# Patient Record
Sex: MALE | Race: BLACK OR AFRICAN AMERICAN | Employment: FULL TIME
[De-identification: names, ages, dates, MRNs, and addresses within clinical notes are randomized per-mention and may not be internally consistent; named-entity substitution may affect disease eponyms.]

---

## 2024-02-21 ENCOUNTER — NURSE TRIAGE (OUTPATIENT)
Dept: OTHER | Facility: CLINIC | Age: 44
End: 2024-02-21

## 2024-02-21 NOTE — TELEPHONE ENCOUNTER
[place]    Received call from [place] at [facility] with Red Flag Complaint.    Subjective: Caller states \"I have pain in my lower back and right hip. I put arch support in my shoes but it caused pain  when I took it out every once in a while tingling in my left calf and I have pain in my left foot when I am on my feet \"     Current Symptoms:     Onset: back pain 5 months ago; foot pain 1 week ago    Associated Symptoms:     Pain Severity: 5/10; aching; constant    Temperature:      What has been tried: arch supports    LMP: NA Pregnant: NA    Recommended disposition: Go to [facility]    Care advice provided, patient verbalizes understanding; denies any other questions or concerns; instructed to call back for any new or worsening symptoms.    Patient/Caller agrees with recommended disposition; writer provided warm transfer to [facility] for appointment scheduling    Attention Provider:  Thank you for allowing me to participate in the care of your patient.  The patient was connected to triage in response to information provided to the [facility].  Please do not respond through this encounter as the response is not directed to a shared pool.        Reason for Disposition   MODERATE back pain (e.g., interferes with normal activities) and present > 3 days   Tingling (e.g., pins and needles) of the face, arm or leg on one side of the body, that is present now (Exceptions: Chronic or recurrent symptom lasting > 4 weeks; or tingling from known cause, such as: bumped elbow, carpal tunnel syndrome, pinched nerve, frostbite.)    Protocols used: Back Pain-ADULT-[place], Neurologic Deficit-ADULT-[place]

## 2024-10-16 ENCOUNTER — OFFICE VISIT (OUTPATIENT)
Facility: CLINIC | Age: 44
End: 2024-10-16
Payer: COMMERCIAL

## 2024-10-16 VITALS
HEART RATE: 69 BPM | TEMPERATURE: 97.9 F | SYSTOLIC BLOOD PRESSURE: 132 MMHG | BODY MASS INDEX: 24.94 KG/M2 | HEIGHT: 69 IN | DIASTOLIC BLOOD PRESSURE: 78 MMHG | WEIGHT: 168.4 LBS | RESPIRATION RATE: 98 BRPM

## 2024-10-16 DIAGNOSIS — M54.50 CHRONIC BILATERAL LOW BACK PAIN WITHOUT SCIATICA: ICD-10-CM

## 2024-10-16 DIAGNOSIS — G89.29 CHRONIC BILATERAL LOW BACK PAIN WITHOUT SCIATICA: ICD-10-CM

## 2024-10-16 DIAGNOSIS — F17.290 CIGAR SMOKER: Primary | ICD-10-CM

## 2024-10-16 PROCEDURE — 99407 BEHAV CHNG SMOKING > 10 MIN: CPT | Performed by: STUDENT IN AN ORGANIZED HEALTH CARE EDUCATION/TRAINING PROGRAM

## 2024-10-16 PROCEDURE — 99213 OFFICE O/P EST LOW 20 MIN: CPT | Performed by: STUDENT IN AN ORGANIZED HEALTH CARE EDUCATION/TRAINING PROGRAM

## 2024-10-16 NOTE — PROGRESS NOTES
Cheko Thurston (:  1980) is a 44 y.o. male here for evaluation of the following chief complaint(s):  Follow-up        ASSESSMENT/PLAN:  1. Cigar smoker  Assessment & Plan:  - discussed cessation options and how smoking is more dangerous than nicotine replacement for 11 minutes    2. Chronic bilateral low back pain without sciatica  Assessment & Plan:  - controleld on mobic  - pt would like to reduce weight in hopes of not needing the mobic       Follow-up and Dispositions    Return in about 5 months (around 3/16/2025) for Annual.         SUBJECTIVE/OBJECTIVE:  HPI  LBP/hip pain  - x 5 months; achy pain; atraumatic  - tried treating with insoles x 2 weeks ago  - no meds to treat, pt states pain is not bad enough  - denies numbness or incontinence  -Update (24)  - no changes; consistently doing back stretch exercises x 2 weeks  - interested in sports medicine referral  Update (10/16/24)  - saw ortho; dx'd with arthritis, rx'd Mobic which helps  - pt plans to take Mobic PRN and  reduce weight    Smoking  - interested in options to quit    ROS as stated above.    /78 (Site: Left Upper Arm, Position: Sitting, Cuff Size: Large Adult)   Pulse 69   Temp 97.9 °F (36.6 °C) (Temporal)   Resp (!) 98   Ht 1.753 m (5' 9\")   Wt 76.4 kg (168 lb 6.4 oz)   BMI 24.87 kg/m²     Physical Exam          An electronic signature was used to authenticate this note.    --David Daevnport MD

## 2024-10-16 NOTE — PROGRESS NOTES
Cheko Thurston is a 44 y.o. year old male who presents today for   Chief Complaint   Patient presents with    Follow-up        \"Have you been to the ER, urgent care clinic since your last visit?  Hospitalized since your last visit?\"   no Where:     When:    Reason:      “Have you seen or consulted any other health care providers outside our system since your last visit?”   NO             Nica Varma CMA  [facility]  Ph: [phone]  Fax: [phone]

## 2024-10-16 NOTE — ASSESSMENT & PLAN NOTE
- discussed cessation options and how smoking is more dangerous than nicotine replacement for 11 minutes

## 2025-03-18 ENCOUNTER — HOSPITAL ENCOUNTER (OUTPATIENT)
Facility: HOSPITAL | Age: 45
Setting detail: SPECIMEN
Discharge: HOME | End: 2025-03-21

## 2025-03-18 ENCOUNTER — OFFICE VISIT (OUTPATIENT)
Facility: CLINIC | Age: 45
End: 2025-03-18
Payer: COMMERCIAL

## 2025-03-18 VITALS
RESPIRATION RATE: 15 BRPM | BODY MASS INDEX: 24.76 KG/M2 | WEIGHT: 167.2 LBS | DIASTOLIC BLOOD PRESSURE: 79 MMHG | SYSTOLIC BLOOD PRESSURE: 134 MMHG | OXYGEN SATURATION: 98 % | HEART RATE: 79 BPM | HEIGHT: 69 IN | TEMPERATURE: 98.1 F

## 2025-03-18 DIAGNOSIS — Z78.9 REGULAR DRINKER OF ALCOHOL: ICD-10-CM

## 2025-03-18 DIAGNOSIS — E78.00 HYPERCHOLESTEREMIA: ICD-10-CM

## 2025-03-18 DIAGNOSIS — F17.290 CIGAR SMOKER: ICD-10-CM

## 2025-03-18 DIAGNOSIS — Z00.00 ENCOUNTER FOR WELL ADULT EXAM WITHOUT ABNORMAL FINDINGS: Primary | ICD-10-CM

## 2025-03-18 DIAGNOSIS — Z13.1 DIABETES MELLITUS SCREENING: ICD-10-CM

## 2025-03-18 LAB — SENTARA SPECIMEN COLLECTION: NORMAL

## 2025-03-18 PROCEDURE — 99396 PREV VISIT EST AGE 40-64: CPT | Performed by: STUDENT IN AN ORGANIZED HEALTH CARE EDUCATION/TRAINING PROGRAM

## 2025-03-18 PROCEDURE — 99408 AUDIT/DAST 15-30 MIN: CPT | Performed by: STUDENT IN AN ORGANIZED HEALTH CARE EDUCATION/TRAINING PROGRAM

## 2025-03-18 PROCEDURE — 99214 OFFICE O/P EST MOD 30 MIN: CPT | Performed by: STUDENT IN AN ORGANIZED HEALTH CARE EDUCATION/TRAINING PROGRAM

## 2025-03-18 PROCEDURE — 99001 SPECIMEN HANDLING PT-LAB: CPT

## 2025-03-18 RX ORDER — NALTREXONE HYDROCHLORIDE 50 MG/1
50 TABLET, FILM COATED ORAL DAILY
Qty: 90 TABLET | Refills: 1 | Status: SHIPPED | OUTPATIENT
Start: 2025-03-18

## 2025-03-18 SDOH — ECONOMIC STABILITY: FOOD INSECURITY: WITHIN THE PAST 12 MONTHS, THE FOOD YOU BOUGHT JUST DIDN'T LAST AND YOU DIDN'T HAVE MONEY TO GET MORE.: NEVER TRUE

## 2025-03-18 SDOH — ECONOMIC STABILITY: FOOD INSECURITY: WITHIN THE PAST 12 MONTHS, YOU WORRIED THAT YOUR FOOD WOULD RUN OUT BEFORE YOU GOT MONEY TO BUY MORE.: NEVER TRUE

## 2025-03-18 ASSESSMENT — PATIENT HEALTH QUESTIONNAIRE - PHQ9
SUM OF ALL RESPONSES TO PHQ QUESTIONS 1-9: 0
SUM OF ALL RESPONSES TO PHQ QUESTIONS 1-9: 0
1. LITTLE INTEREST OR PLEASURE IN DOING THINGS: NOT AT ALL
2. FEELING DOWN, DEPRESSED OR HOPELESS: NOT AT ALL
SUM OF ALL RESPONSES TO PHQ QUESTIONS 1-9: 0
SUM OF ALL RESPONSES TO PHQ QUESTIONS 1-9: 0

## 2025-03-18 NOTE — PROGRESS NOTES
Well Adult Note  Name: Cheko Thurston Today’s Date: 3/18/2025   MRN: [id number] Sex: Male   Age: 45 y.o. Ethnicity: Non- / Non    : 1980 Race: Black /       Cheko Thurston is here for a well adult exam.       Assessment & Plan   Encounter for well adult exam without abnormal findings  Hypercholesteremia  Assessment & Plan:  - update labs today   Orders:  -     Lipid Panel; Future  Diabetes mellitus screening  -     Hemoglobin A1C; Future  Regular drinker of alcohol  Assessment & Plan:  - discussed cessation strategies and deleterious effects of smoking and EtOH on brain, vascular, cardiac, and hepatic functions for 15 minutes  - trial of naltrexone 50 QD; can increase to 100 QD if little benefit; has not tried other drinking cessation meds yet  - RTC x 2 weeks     Orders:  -     Comprehensive Metabolic Panel; Future  -     Gamma GT; Future  -     naltrexone (DEPADE) 50 MG tablet; Take 1 tablet by mouth daily, Disp-90 tablet, R-1Normal  Cigar smoker  Assessment & Plan:  - discussed cessation strategies and deleterious effects of smoking and EtOH on brain, vascular, cardiac, and hepatic functions for 15 minutes   - daily naltrexone might help with cessation   Orders:  -     CBC; Future  -     naltrexone (DEPADE) 50 MG tablet; Take 1 tablet by mouth daily, Disp-90 tablet, R-1Normal      Return in 2 weeks (on 2025) for lab results and EtOH fu, virtual.       Subjective   History:    Will decide between cologuard and colonoscopy; pt will let me know his decision this week.    Has drastically reduced cigars to just one rarely; still 1-2 EtOH drinks daily; wants help with reducing EtOH.    Review of Systems    No Known Allergies  Prior to Visit Medications    Medication Sig Taking? Authorizing Provider   naltrexone (DEPADE) 50 MG tablet Take 1 tablet by mouth daily Yes David Davenport MD   meloxicam (MOBIC) 15 MG tablet Take 1 tablet by mouth daily  Patient not taking: Reported on

## 2025-03-18 NOTE — ASSESSMENT & PLAN NOTE
- discussed cessation strategies and deleterious effects of smoking and EtOH on brain, vascular, cardiac, and hepatic functions for 15 minutes   - daily naltrexone might help with cessation

## 2025-03-18 NOTE — PATIENT INSTRUCTIONS
David colon cancer screening options are Cologuard (easy) or colonoscopy (hard but definitive). Please let me know which you would like.     Well Visit, Ages 18 to 65: Care Instructions  Well visits can help you stay healthy. Your doctor has checked your overall health and may have suggested ways to take good care of yourself. Your doctor also may have recommended tests. You can help prevent illness with healthy eating, good sleep, vaccinations, regular exercise, and other steps.    Get the tests that you and your doctor decide on. Depending on your age and risks, examples might include screening for diabetes; hepatitis C; HIV; and cervical, breast, lung, and colon cancer. Screening helps find diseases before any symptoms appear.   Eat healthy foods. Choose fruits, vegetables, whole grains, lean protein, and low-fat dairy foods. Limit saturated fat and reduce salt.     Limit alcohol. Men should have no more than 2 drinks a day. Women should have no more than 1. For some people, no alcohol is the best choice.   Exercise. Get at least 30 minutes of exercise on most days of the week. Walking can be a good choice.     Reach and stay at your healthy weight. This will lower your risk for many health problems.   Take care of your mental health. Try to stay connected with friends, family, and community, and find ways to manage stress.     If you're feeling depressed or hopeless, talk to someone. A counselor can help. If you don't have a counselor, talk to your doctor.   Talk to your doctor if you think you may have a problem with alcohol or drug use. This includes prescription medicines, marijuana, and other drugs.     Avoid tobacco and nicotine: Don't smoke, vape, or chew. If you need help quitting, talk to your doctor.   Practice safer sex. Getting tested, using condoms or dental dams, and limiting sex partners can help prevent STIs.     Use birth control if it's important to you to prevent pregnancy. Talk with your doctor

## 2025-03-18 NOTE — ASSESSMENT & PLAN NOTE
- discussed cessation strategies and deleterious effects of smoking and EtOH on brain, vascular, cardiac, and hepatic functions for 15 minutes  - trial of naltrexone 50 QD; can increase to 100 QD if little benefit; has not tried other drinking cessation meds yet  - RTC x 2 weeks

## 2025-03-18 NOTE — PROGRESS NOTES
Cheko Thurston is a 45 y.o. year old male who presents today for   Chief Complaint   Patient presents with    Annual Exam        \"Have you been to the ER, urgent care clinic since your last visit?  Hospitalized since your last visit?\"   NO     “Have you seen or consulted any other health care providers outside our system since your last visit?”   NO     “Have you had a colorectal cancer screening such as a colonoscopy/FIT/Cologuard?    NO    No colonoscopy on file  No cologuard on file  No FIT/FOBT on file   No flexible sigmoidoscopy on file           Nica Varma [facility]  [facility]  Ph: [phone]  Fax: [phone]

## 2025-03-19 LAB
A/G RATIO: 2.2 RATIO (ref 1.1–2.6)
ALBUMIN: 4.9 G/DL (ref 3.5–5)
ALP BLD-CCNC: 92 U/L (ref 25–115)
ALT SERPL-CCNC: 15 U/L (ref 5–40)
ANION GAP SERPL CALCULATED.3IONS-SCNC: 14 MMOL/L (ref 3–15)
AST SERPL-CCNC: 19 U/L (ref 10–37)
BILIRUB SERPL-MCNC: 0.4 MG/DL (ref 0.2–1.2)
BUN BLDV-MCNC: 10 MG/DL (ref 6–22)
CALCIUM SERPL-MCNC: 9.4 MG/DL (ref 8.4–10.5)
CHLORIDE BLD-SCNC: 98 MMOL/L (ref 98–110)
CHOLESTEROL, TOTAL: 256 MG/DL (ref 110–200)
CHOLESTEROL/HDL RATIO: 3.9 (ref 0–5)
CO2: 25 MMOL/L (ref 20–32)
CREAT SERPL-MCNC: 0.9 MG/DL (ref 0.5–1.2)
ESTIMATED AVERAGE GLUCOSE: 88 MG/DL (ref 91–123)
GAMMA GLUTAMYL TRANSFERASE: 30 U/L (ref 5–60)
GFR, ESTIMATED: >60 ML/MIN/1.73 SQ.M.
GLOBULIN: 2.2 G/DL (ref 2–4)
GLUCOSE: 101 MG/DL (ref 70–99)
HBA1C MFR BLD: 4.7 % (ref 4.8–5.6)
HCT VFR BLD CALC: 43.1 % (ref 36.6–51.9)
HDLC SERPL-MCNC: 66 MG/DL
HEMOGLOBIN: 14.4 G/DL (ref 13.2–17.3)
LDL CHOLESTEROL: 165 MG/DL (ref 50–99)
LDL/HDL RATIO: 2.5
MCH RBC QN AUTO: 33 PG (ref 26–34)
MCHC RBC AUTO-ENTMCNC: 33 G/DL (ref 31–36)
MCV RBC AUTO: 99 FL (ref 80–95)
NON-HDL CHOLESTEROL: 190 MG/DL
PDW BLD-RTO: 11.9 % (ref 10–15.5)
PLATELET # BLD: 256 K/UL (ref 140–440)
PMV BLD AUTO: 10.5 FL (ref 9–13)
POTASSIUM SERPL-SCNC: 4.3 MMOL/L (ref 3.5–5.5)
RBC # BLD: 4.35 M/UL (ref 3.8–5.8)
SODIUM BLD-SCNC: 137 MMOL/L (ref 133–145)
TOTAL PROTEIN: 7.1 G/DL (ref 6.4–8.3)
TRIGL SERPL-MCNC: 122 MG/DL (ref 40–149)
VLDLC SERPL CALC-MCNC: 24 MG/DL (ref 8–30)
WBC # BLD: 4 K/UL (ref 4–11)

## 2025-04-01 ENCOUNTER — TELEMEDICINE (OUTPATIENT)
Facility: CLINIC | Age: 45
End: 2025-04-01
Payer: COMMERCIAL

## 2025-04-01 DIAGNOSIS — Z78.9 REGULAR DRINKER OF ALCOHOL: ICD-10-CM

## 2025-04-01 DIAGNOSIS — E78.00 HYPERCHOLESTEREMIA: Primary | ICD-10-CM

## 2025-04-01 PROCEDURE — G2211 COMPLEX E/M VISIT ADD ON: HCPCS | Performed by: STUDENT IN AN ORGANIZED HEALTH CARE EDUCATION/TRAINING PROGRAM

## 2025-04-01 PROCEDURE — 99214 OFFICE O/P EST MOD 30 MIN: CPT | Performed by: STUDENT IN AN ORGANIZED HEALTH CARE EDUCATION/TRAINING PROGRAM

## 2025-04-01 NOTE — PROGRESS NOTES
Cheko Thurston, was evaluated through a synchronous (real-time) audio-video encounter. The patient (or guardian if applicable) is aware that this is a billable service, which includes applicable co-pays. This Virtual Visit was conducted with patient's (and/or legal guardian's) consent. Patient identification was verified, and a caregiver was present when appropriate.   The patient was located at Home: [address] [place] [id number]  Provider was located at Facility (Appt Dept): [address] [facility], [address]  [place] [address]  Confirm you are appropriately licensed, registered, or certified to deliver care in the state where the patient is located as indicated above. If you are not or unsure, please re-schedule the visit: Yes, I confirm.     Cheko Thurston (:  1980) is a Established patient, presenting virtually for evaluation of the following:      Below is the assessment and plan developed based on review of pertinent history, physical exam, labs, studies, and medications.     Assessment & Plan  Hypercholesteremia     - LDL unchanged  - pt to improve diet  - check lipids in 6 months  Orders:    Lipid Panel; Future    Regular drinker of alcohol     Total cessation achieved         Return in about 6 months (around 10/1/2025) for lipid.       Subjective   HPI  Review of Systems     EtOH  Has drastically reduced cigars to just one rarely; still 1-2 EtOH drinks daily; wants help with reducing EtOH.   Update (25)  - he has stopped totally    Objective   Patient-Reported Vitals  No data recorded     Physical Exam  Constitutional:       Appearance: Normal appearance.   Pulmonary:      Effort: Pulmonary effort is normal.   Neurological:      Mental Status: He is alert and oriented to person, place, and time.   Psychiatric:         Mood and Affect: Mood normal.                  --David Davenport MD

## 2025-04-01 NOTE — ASSESSMENT & PLAN NOTE
- LDL unchanged  - pt to improve diet  - check lipids in 6 months  Orders:    Lipid Panel; Future